# Patient Record
Sex: FEMALE | Race: WHITE | NOT HISPANIC OR LATINO | Employment: FULL TIME | URBAN - METROPOLITAN AREA
[De-identification: names, ages, dates, MRNs, and addresses within clinical notes are randomized per-mention and may not be internally consistent; named-entity substitution may affect disease eponyms.]

---

## 2020-03-06 ENCOUNTER — HOSPITAL ENCOUNTER (EMERGENCY)
Facility: HOSPITAL | Age: 70
Discharge: HOME | End: 2020-03-06
Attending: EMERGENCY MEDICINE | Admitting: EMERGENCY MEDICINE
Payer: COMMERCIAL

## 2020-03-06 VITALS
SYSTOLIC BLOOD PRESSURE: 128 MMHG | OXYGEN SATURATION: 97 % | RESPIRATION RATE: 17 BRPM | BODY MASS INDEX: 25.78 KG/M2 | TEMPERATURE: 97.8 F | DIASTOLIC BLOOD PRESSURE: 71 MMHG | WEIGHT: 127.65 LBS | HEART RATE: 84 BPM

## 2020-03-06 DIAGNOSIS — R42 DIZZINESS: Primary | ICD-10-CM

## 2020-03-06 DIAGNOSIS — Z77.098 EXPOSURE TO CHEMICAL INHALATION: ICD-10-CM

## 2020-03-06 PROCEDURE — 99285 EMERGENCY DEPT VISIT HI MDM: CPT | Performed by: PHYSICIAN ASSISTANT

## 2020-03-06 PROCEDURE — 93005 ELECTROCARDIOGRAM TRACING: CPT

## 2020-03-06 PROCEDURE — 99284 EMERGENCY DEPT VISIT MOD MDM: CPT

## 2020-03-07 LAB
ATRIAL RATE: 74 BPM
P AXIS: 64 DEGREES
PR INTERVAL: 124 MS
QRS AXIS: 70 DEGREES
QRSD INTERVAL: 72 MS
QT INTERVAL: 370 MS
QTC INTERVAL: 410 MS
T WAVE AXIS: 74 DEGREES
VENTRICULAR RATE: 74 BPM

## 2020-03-07 PROCEDURE — 93010 ELECTROCARDIOGRAM REPORT: CPT | Performed by: INTERNAL MEDICINE

## 2020-03-07 NOTE — ED PROVIDER NOTES
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, states dizziness was first noticed after use of this spray, no previous issues with dizziness  She denies wearing a mask while using spray  She admits to dizziness only while at her job using the spray, states symptoms resolve shortly after leaving [employer]  She does not take any medications  She denies headache, nausea, vomiting, syncope, chest pain, shortness of breath, increased weakness or fatigue, decreased oral intake, myalgias, confusion or disorientation  No head trauma or falls recently  Feels that her balance while standing and walking is normal  Currently states she is asymptomatic  None       History reviewed  No pertinent past medical history  History reviewed  No pertinent surgical history  History reviewed  No pertinent family history  I have reviewed and agree with the history as documented  E-Cigarette/Vaping     E-Cigarette/Vaping Substances     Social History     Tobacco Use    Smoking status: Current Every Day Smoker    Smokeless tobacco: Never Used   Substance Use Topics    Alcohol use: Never     Frequency: Never    Drug use: Not on file       Review of Systems   Constitutional: Positive for activity change  Negative for chills, diaphoresis, fatigue and fever  HENT: Negative for congestion, ear pain, sinus pressure and sinus pain  Eyes: Negative for photophobia, pain and visual disturbance     Respiratory: Negative for cough, chest tightness and shortness of breath  Cardiovascular: Negative for chest pain and palpitations  Gastrointestinal: Negative for abdominal distention, abdominal pain, blood in stool, diarrhea, nausea and vomiting  Genitourinary: Negative for decreased urine volume, difficulty urinating, dysuria, frequency, hematuria and urgency  Musculoskeletal: Negative for myalgias and neck pain  Skin: Negative for pallor and rash  Neurological: Positive for dizziness and light-headedness  Negative for tremors, seizures, syncope, facial asymmetry, speech difficulty, weakness, numbness and headaches  Hematological: Negative for adenopathy  Psychiatric/Behavioral: Negative for confusion  Physical Exam  Physical Exam   Constitutional: She is oriented to person, place, and time  She appears well-developed and well-nourished  She is cooperative  Non-toxic appearance  No distress  HENT:   Head: Normocephalic and atraumatic  Right Ear: Tympanic membrane normal    Left Ear: Tympanic membrane normal    Mouth/Throat: Oropharynx is clear and moist and mucous membranes are normal    Eyes: Pupils are equal, round, and reactive to light  Conjunctivae and EOM are normal    Neck: Normal range of motion and full passive range of motion without pain  Neck supple  Cardiovascular: Normal rate, regular rhythm, normal heart sounds and intact distal pulses  Exam reveals no decreased pulses  No murmur heard  Pulmonary/Chest: Effort normal and breath sounds normal    Abdominal: Soft  Normal appearance, normal aorta and bowel sounds are normal  There is no tenderness  Neurological: She is alert and oriented to person, place, and time  She has normal strength  No cranial nerve deficit or sensory deficit  She displays a negative Romberg sign  Coordination and gait normal  GCS eye subscore is 4  GCS verbal subscore is 5  GCS motor subscore is 6  Skin: Skin is warm and dry  Capillary refill takes less than 2 seconds   No lesion and no rash noted  She is not diaphoretic  No erythema  No pallor  Vitals reviewed  Vital Signs  ED Triage Vitals [03/06/20 2218]   Temperature Pulse Respirations Blood Pressure SpO2   97 8 °F (36 6 °C) 84 17 128/71 97 %      Temp Source Heart Rate Source Patient Position - Orthostatic VS BP Location FiO2 (%)   Oral Monitor -- Right arm --      Pain Score       No Pain           Vitals:    03/06/20 2218   BP: 128/71   Pulse: 84         Visual Acuity  Visual Acuity      Most Recent Value   L Pupil Size (mm)  3   R Pupil Size (mm)  3          ED Medications  Medications - No data to display    Diagnostic Studies  Results Reviewed     None                 No orders to display              Procedures  ECG 12 Lead Documentation Only  Date/Time: 3/7/2020 3:27 AM  Performed by: Tiff Cooley PA-C  Authorized by: Tiff Cooley PA-C     Indications / Diagnosis:  Dizziness  ECG reviewed by me, the ED Provider: yes    Patient location:  ED and bedside  Previous ECG:     Previous ECG:  Unavailable    Comparison to cardiac monitor: No    Interpretation:     Interpretation: normal    Rate:     ECG rate:  74    ECG rate assessment: normal    Rhythm:     Rhythm: sinus rhythm    Ectopy:     Ectopy: none    QRS:     QRS axis:  Normal    QRS intervals:  Normal  Conduction:     Conduction: normal    ST segments:     ST segments:  Normal  T waves:     T waves: inverted      Inverted:  AVL             ED Course  ED Course as of Mar 07 0328   Fri Mar 06, 2020   2247 Patient presents for dizziness for the past 3 days, which started after she began using any disinfected spray at work  No previous dizziness like this before  Dizziness resolves after leaving her job where she uses the disinfectant  No dizziness presently  No chest pain or any other symptoms  Vital signs stable  Check EKG which shows normal sinus rhythm with no acute ST segment elevation and isolated T-wave inversion in aVL    Offered blood work to check for leukocytosis, electrolytes, and kidney function but patient declines  Supplied patient with 3 masks and instructed that she wear these at work while using disinfectant spray  Instructed follow-up with PCP if no improvement or worsening  MDM  Number of Diagnoses or Management Options  Dizziness:   Exposure to chemical inhalation:      Amount and/or Complexity of Data Reviewed  Tests in the medicine section of CPT®: ordered and reviewed  Decide to obtain previous medical records or to obtain history from someone other than the patient: yes  Obtain history from someone other than the patient: yes  Review and summarize past medical records: yes  Discuss the patient with other providers: yes    Risk of Complications, Morbidity, and/or Mortality  General comments: See ED course note for dispo and plan  I reviewed and discussed EKG findings with the patient at bedside  I discussed emergency department return parameters  I answered any and all questions the patient had regarding emergency department course of evaluation and treatment  The patient verbalized understanding of and agreement with plan  Patient Progress  Patient progress: stable        Disposition  Final diagnoses:   Dizziness   Exposure to chemical inhalation     Time reflects when diagnosis was documented in both MDM as applicable and the Disposition within this note     Time User Action Codes Description Comment    3/6/2020 10:45 PM Zechariah Workman Add [R42] Dizziness     3/6/2020 10:45 PM Zechariah Workman Add [Z77 098] Exposure to chemical inhalation       ED Disposition     ED Disposition Condition Date/Time Comment    Discharge Stable Fri Mar 6, 2020 10:45 PM [address] discharge to home/self care              Follow-up Information     Follow up With Specialties Details Why Contact Info Additional Information    Miac Jackson,  General Practice Schedule an appointment as soon as possible for a visit in 3 days If symptoms worsen or do not improve [address]  [facility] [address]  [address] [facility] Emergency Department Emergency Medicine Go to  For further evaluation [address] [facility] [address] [facility], [address] [place] [address], [id number]          There are no discharge medications for this patient  No discharge procedures on file      PDMP Review     None          ED Provider  Electronically Signed by           Waldo Sheehan PA-C  03/07/20 4486

## 2020-03-08 ENCOUNTER — HOSPITAL ENCOUNTER (EMERGENCY)
Facility: HOSPITAL | Age: 70
Discharge: HOME | End: 2020-03-08
Attending: EMERGENCY MEDICINE | Admitting: EMERGENCY MEDICINE
Payer: COMMERCIAL

## 2020-03-08 VITALS
DIASTOLIC BLOOD PRESSURE: 58 MMHG | TEMPERATURE: 98.4 F | BODY MASS INDEX: 25.65 KG/M2 | RESPIRATION RATE: 18 BRPM | WEIGHT: 127 LBS | HEART RATE: 73 BPM | SYSTOLIC BLOOD PRESSURE: 119 MMHG | OXYGEN SATURATION: 99 %

## 2020-03-08 DIAGNOSIS — R42 VERTIGO: Primary | ICD-10-CM

## 2020-03-08 LAB
ALBUMIN SERPL BCP-MCNC: 3.6 G/DL (ref 3.5–5)
ALP SERPL-CCNC: 95 U/L (ref 46–116)
ALT SERPL W P-5'-P-CCNC: 16 U/L (ref 12–78)
ANION GAP SERPL CALCULATED.3IONS-SCNC: 6 MMOL/L (ref 4–13)
AST SERPL W P-5'-P-CCNC: 12 U/L (ref 5–45)
BASOPHILS # BLD AUTO: 0.13 THOUSANDS/ΜL (ref 0–0.1)
BASOPHILS NFR BLD AUTO: 2 % (ref 0–1)
BILIRUB SERPL-MCNC: 0.2 MG/DL (ref 0.2–1)
BUN SERPL-MCNC: 21 MG/DL (ref 5–25)
CALCIUM SERPL-MCNC: 8.8 MG/DL (ref 8.3–10.1)
CHLORIDE SERPL-SCNC: 107 MMOL/L (ref 100–108)
CO2 SERPL-SCNC: 28 MMOL/L (ref 21–32)
CREAT SERPL-MCNC: 0.83 MG/DL (ref 0.6–1.3)
EOSINOPHIL # BLD AUTO: 0.39 THOUSAND/ΜL (ref 0–0.61)
EOSINOPHIL NFR BLD AUTO: 6 % (ref 0–6)
ERYTHROCYTE [DISTWIDTH] IN BLOOD BY AUTOMATED COUNT: 13.5 % (ref 11.6–15.1)
GFR SERPL CREATININE-BSD FRML MDRD: 73 ML/MIN/1.73SQ M
GLUCOSE SERPL-MCNC: 93 MG/DL (ref 65–140)
HCT VFR BLD AUTO: 40.3 % (ref 34.8–46.1)
HGB BLD-MCNC: 12.9 G/DL (ref 11.5–15.4)
IMM GRANULOCYTES # BLD AUTO: 0.02 THOUSAND/UL (ref 0–0.2)
IMM GRANULOCYTES NFR BLD AUTO: 0 % (ref 0–2)
LYMPHOCYTES # BLD AUTO: 2.33 THOUSANDS/ΜL (ref 0.6–4.47)
LYMPHOCYTES NFR BLD AUTO: 33 % (ref 14–44)
MCH RBC QN AUTO: 30.3 PG (ref 26.8–34.3)
MCHC RBC AUTO-ENTMCNC: 32 G/DL (ref 31.4–37.4)
MCV RBC AUTO: 95 FL (ref 82–98)
MONOCYTES # BLD AUTO: 0.86 THOUSAND/ΜL (ref 0.17–1.22)
MONOCYTES NFR BLD AUTO: 12 % (ref 4–12)
NEUTROPHILS # BLD AUTO: 3.39 THOUSANDS/ΜL (ref 1.85–7.62)
NEUTS SEG NFR BLD AUTO: 47 % (ref 43–75)
NRBC BLD AUTO-RTO: 0 /100 WBCS
PLATELET # BLD AUTO: 168 THOUSANDS/UL (ref 149–390)
PMV BLD AUTO: 11.5 FL (ref 8.9–12.7)
POTASSIUM SERPL-SCNC: 4.8 MMOL/L (ref 3.5–5.3)
PROT SERPL-MCNC: 6.8 G/DL (ref 6.4–8.2)
RBC # BLD AUTO: 4.26 MILLION/UL (ref 3.81–5.12)
SODIUM SERPL-SCNC: 141 MMOL/L (ref 136–145)
WBC # BLD AUTO: 7.12 THOUSAND/UL (ref 4.31–10.16)

## 2020-03-08 PROCEDURE — 85025 COMPLETE CBC W/AUTO DIFF WBC: CPT | Performed by: EMERGENCY MEDICINE

## 2020-03-08 PROCEDURE — 36415 COLL VENOUS BLD VENIPUNCTURE: CPT | Performed by: EMERGENCY MEDICINE

## 2020-03-08 PROCEDURE — 99284 EMERGENCY DEPT VISIT MOD MDM: CPT

## 2020-03-08 PROCEDURE — 93005 ELECTROCARDIOGRAM TRACING: CPT

## 2020-03-08 PROCEDURE — 99284 EMERGENCY DEPT VISIT MOD MDM: CPT | Performed by: EMERGENCY MEDICINE

## 2020-03-08 PROCEDURE — 80053 COMPREHEN METABOLIC PANEL: CPT | Performed by: EMERGENCY MEDICINE

## 2020-03-08 RX ORDER — MECLIZINE HCL 12.5 MG/1
12.5 TABLET ORAL ONCE
Status: COMPLETED | OUTPATIENT
Start: 2020-03-08 | End: 2020-03-08

## 2020-03-08 RX ORDER — MECLIZINE HCL 12.5 MG/1
12.5 TABLET ORAL EVERY 12 HOURS PRN
Qty: 14 TABLET | Refills: 0 | Status: SHIPPED | OUTPATIENT
Start: 2020-03-08 | End: 2020-03-15

## 2020-03-08 RX ADMIN — MECLIZINE HCL 12.5 MG 12.5 MG: 12.5 TABLET ORAL at 17:03

## 2020-03-08 NOTE — ED PROVIDER NOTES
History  Chief Complaint   Patient presents with    Dizziness     pt states she was seen friday night for dizziness and "i am still a little dizzi"      HPI     79-year-old female with no significant medical history presenting for evaluation dizziness that is been present for the last 5 days  Dizziness is described as a feeling of my equilibrium being off when I walk   She denies any sensation of dizziness when sitting down or lying flat  It is worsened by changing positions  She 1st noticed it after using a disinfectant spray at work and initially thought that it was because of that, however has not use the spray in several days and continues to experience this intermittently  She denies confusion or disorientation  No vision changes or headache  No history of stroke or TIA  Denies nausea, vomiting, syncope, shortness of breath, focal weakness, sensory deficits, or difficulty speaking  She has not fallen  She was seen in the emergency department 2 days ago and at that time declined testing  None       History reviewed  No pertinent past medical history  History reviewed  No pertinent surgical history  History reviewed  No pertinent family history  I have reviewed and agree with the history as documented  E-Cigarette/Vaping    E-Cigarette Use Never User      E-Cigarette/Vaping Substances     Social History     Tobacco Use    Smoking status: Current Every Day Smoker     Packs/day: 0 50     Types: Cigarettes    Smokeless tobacco: Never Used   Substance Use Topics    Alcohol use: Never     Frequency: Never    Drug use: Not on file       Review of Systems   Constitutional: Negative for chills and fever  HENT: Negative for congestion  Eyes: Negative for visual disturbance  Respiratory: Negative for cough and shortness of breath  Cardiovascular: Negative for chest pain and leg swelling  Gastrointestinal: Negative for abdominal pain, diarrhea, nausea and vomiting     Genitourinary: Negative for dysuria and frequency  Musculoskeletal: Negative for arthralgias, back pain, neck pain and neck stiffness  Skin: Negative for rash  Neurological: Positive for dizziness  Negative for syncope, facial asymmetry, speech difficulty, weakness, light-headedness, numbness and headaches  Psychiatric/Behavioral: Negative for agitation, behavioral problems and confusion  Physical Exam  Physical Exam   Constitutional: She is oriented to person, place, and time  She appears well-developed and well-nourished  No distress  HENT:   Head: Normocephalic and atraumatic  Right Ear: External ear normal    Left Ear: External ear normal    Nose: Nose normal    Mouth/Throat: Oropharynx is clear and moist    TMs unremarkable bilaterally   Eyes: Pupils are equal, round, and reactive to light  Conjunctivae and EOM are normal    Horizontal and gaze nystagmus, appears to be leftward, fatigable   Neck: Normal range of motion  Neck supple  Cardiovascular: Normal rate, regular rhythm and normal heart sounds  Exam reveals no gallop and no friction rub  No murmur heard  Pulmonary/Chest: Effort normal and breath sounds normal  No respiratory distress  She has no wheezes  She has no rales  Abdominal: Soft  Bowel sounds are normal  She exhibits no distension  There is no tenderness  There is no guarding  Musculoskeletal: Normal range of motion  She exhibits no edema or deformity  Neurological: She is alert and oriented to person, place, and time  She exhibits normal muscle tone  Face symmetric, tongue midline, 5/5 strength in the proximal and distal upper and lower extremities bilaterally with intact sensation to light touch throughout  CN II-XII intact  Normal finger-to-nose, rapid alternating movements, and heel-to-shin bilaterally  Normal speech, normal gait  No pronator drift  Skin: Skin is warm and dry  She is not diaphoretic         Vital Signs  ED Triage Vitals [03/08/20 1610]   Temperature Pulse Respirations Blood Pressure SpO2   98 4 °F (36 9 °C) 73 20 155/66 94 %      Temp Source Heart Rate Source Patient Position - Orthostatic VS BP Location FiO2 (%)   Oral Monitor Sitting Left arm --      Pain Score       --           Vitals:    03/08/20 1610 03/08/20 1702   BP: 155/66 119/58   Pulse: 73 73   Patient Position - Orthostatic VS: Sitting Lying         Visual Acuity  Visual Acuity      Most Recent Value   L Pupil Size (mm)  3   R Pupil Size (mm)  3          ED Medications  Medications   meclizine (ANTIVERT) tablet 12 5 mg (12 5 mg Oral Given 3/8/20 1703)       Diagnostic Studies  Results Reviewed     Procedure Component Value Units Date/Time    Comprehensive metabolic panel [[id number]] Collected:  03/08/20 1707    Lab Status:  Final result Specimen:  Blood from Arm, Right Updated:  03/08/20 1728     Sodium 141 mmol/L      Potassium 4 8 mmol/L      Chloride 107 mmol/L      CO2 28 mmol/L      ANION GAP 6 mmol/L      BUN 21 mg/dL      Creatinine 0 83 mg/dL      Glucose 93 mg/dL      Calcium 8 8 mg/dL      AST 12 U/L      ALT 16 U/L      Alkaline Phosphatase 95 U/L      Total Protein 6 8 g/dL      Albumin 3 6 g/dL      Total Bilirubin 0 20 mg/dL      eGFR 73 ml/min/1 73sq m     Narrative:       [place] guidelines for Chronic Kidney Disease (CKD):     Stage 1 with normal or high GFR (GFR > 90 mL/min/1 73 square meters)    Stage 2 Mild CKD (GFR = 60-89 mL/min/1 73 square meters)    Stage 3A Moderate CKD (GFR = 45-59 mL/min/1 73 square meters)    Stage 3B Moderate CKD (GFR = 30-44 mL/min/1 73 square meters)    Stage 4 Severe CKD (GFR = 15-29 mL/min/1 73 square meters)    Stage 5 End Stage CKD (GFR <15 mL/min/1 73 square meters)  Note: GFR calculation is accurate only with a steady state creatinine    CBC and differential [[id number]]  (Abnormal) Collected:  03/08/20 1707    Lab Status:  Final result Specimen:  Blood from Arm, Right Updated:  03/08/20 1712     WBC 7 12 Thousand/uL      RBC 4 26 Million/uL      Hemoglobin 12 9 g/dL      Hematocrit 40 3 %      MCV 95 fL      MCH 30 3 pg      MCHC 32 0 g/dL      RDW 13 5 %      MPV 11 5 fL      Platelets 180 Thousands/uL      nRBC 0 /100 WBCs      Neutrophils Relative 47 %      Immat GRANS % 0 %      Lymphocytes Relative 33 %      Monocytes Relative 12 %      Eosinophils Relative 6 %      Basophils Relative 2 %      Neutrophils Absolute 3 39 Thousands/µL      Immature Grans Absolute 0 02 Thousand/uL      Lymphocytes Absolute 2 33 Thousands/µL      Monocytes Absolute 0 86 Thousand/µL      Eosinophils Absolute 0 39 Thousand/µL      Basophils Absolute 0 13 Thousands/µL                  No orders to display              Procedures  Procedures         ED Course           Identification of Seniors at Risk      Most Recent Value   (ISAR) Identification of Seniors at Risk   Before the illness or injury that brought you to the Emergency, did you need someone to help you on a regular basis? 0 Filed at: 03/08/2020 1612   In the last 24 hours, have you needed more help than usual?  0 Filed at: 03/08/2020 1612   Have you been hospitalized for one or more nights during the past 6 months? 0 Filed at: 03/08/2020 1612   In general, do you see well?  0 Filed at: 03/08/2020 1612   In general, do you have serious problems with your memory? 0 Filed at: 03/08/2020 1612   Do you take more than three different medications every day?  0 Filed at: 03/08/2020 1612   ISAR Score  0 Filed at: 03/08/2020 1612                          MDM  Number of Diagnoses or Management Options  Vertigo: new and requires workup  Diagnosis management comments: Generally well appearing  Afebrile and hemodynamically stable  Normal neurologic exam as above  No central findings of ataxia  Patient walks with a steady gait states that she feels unsteady on her feet  She does have leftward fatigable end gaze horizontal nystagmus    Sensation of disequilibrium is present only with moving  Suspect benign paroxysmal positional vertigo  No evidence of stroke or TIA  I personally interpreted the patient's EKG which reveals rate normal sinus rhythm, normal axis, normal intervals, Q-wave in leads aVL V1 unchanged from prior, no acute ischemic changes  CBC and CMP unremarkable  Patient given meclizine, states that now she feels much better  Will prescribe meclizine p r n  Recommend follow up with her doctor as needed in 1 week if symptoms persist, patient counseled that she may need follow-up vestibular therapy if symptoms persist   Return precautions discussed and patient discharged in good condition  Amount and/or Complexity of Data Reviewed  Clinical lab tests: ordered and reviewed           Disposition  Final diagnoses:   Vertigo     Time reflects when diagnosis was documented in both MDM as applicable and the Disposition within this note     Time User Action Codes Description Comment    3/8/2020  5:54 PM Adela Woodruff Add [R42] Vertigo       ED Disposition     ED Disposition Condition Date/Time Comment    Discharge Stable Sun Mar 8, 2020  5:54 PM [address] discharge to home/self care  Follow-up Information     Follow up With Specialties Details Why Contact Info Additional Information    Clif Hunter, DO General Practice Call in 1 week If your dizziness persists  [address]  [facility] [address] Emergency Department Emergency Medicine  As we discussed, return to the Emergency Department immediately for worsening dizziness, headache, chest pain, vision changes, difficulty walking or speaking, or new or concerning symptoms   [address] [facility] [address]  [address] [facility] ED, [address] [place], [place], [address]          Discharge Medication List as of 3/8/2020  5:55 PM      START taking these medications    Details   meclizine (ANTIVERT) 12 5 MG tablet Take 1 tablet (12 5 mg total) by mouth every 12 (twelve) hours as needed for dizziness for up to 7 days, Starting Sun 3/8/2020, Until Sun 3/15/2020, Print           No discharge procedures on file      PDMP Review     None          ED Provider  Electronically Signed by           Jose Alfredo Escobar MD  03/08/20 5906

## 2020-03-09 LAB
ATRIAL RATE: 76 BPM
P AXIS: 79 DEGREES
PR INTERVAL: 120 MS
QRS AXIS: 84 DEGREES
QRSD INTERVAL: 74 MS
QT INTERVAL: 364 MS
QTC INTERVAL: 409 MS
T WAVE AXIS: 80 DEGREES
VENTRICULAR RATE: 76 BPM

## 2020-03-09 PROCEDURE — 93010 ELECTROCARDIOGRAM REPORT: CPT | Performed by: INTERNAL MEDICINE

## 2020-05-05 ENCOUNTER — PREPPED CHART (OUTPATIENT)
Dept: URBAN - METROPOLITAN AREA CLINIC 6 | Facility: CLINIC | Age: 70
End: 2020-05-05

## 2020-08-09 ENCOUNTER — OFFICE VISIT (OUTPATIENT)
Dept: URGENT CARE | Facility: CLINIC | Age: 70
End: 2020-08-09
Payer: COMMERCIAL

## 2020-08-09 ENCOUNTER — APPOINTMENT (OUTPATIENT)
Dept: RADIOLOGY | Facility: CLINIC | Age: 70
End: 2020-08-09
Payer: COMMERCIAL

## 2020-08-09 VITALS
HEIGHT: 59 IN | DIASTOLIC BLOOD PRESSURE: 80 MMHG | BODY MASS INDEX: 25.8 KG/M2 | RESPIRATION RATE: 16 BRPM | WEIGHT: 128 LBS | TEMPERATURE: 97.8 F | SYSTOLIC BLOOD PRESSURE: 140 MMHG | OXYGEN SATURATION: 100 % | HEART RATE: 82 BPM

## 2020-08-09 DIAGNOSIS — S99.921A INJURY OF TOE ON RIGHT FOOT, INITIAL ENCOUNTER: Primary | ICD-10-CM

## 2020-08-09 DIAGNOSIS — S99.921A INJURY OF TOE ON RIGHT FOOT, INITIAL ENCOUNTER: ICD-10-CM

## 2020-08-09 PROCEDURE — 99213 OFFICE O/P EST LOW 20 MIN: CPT | Performed by: PHYSICIAN ASSISTANT

## 2020-08-09 PROCEDURE — S9088 SERVICES PROVIDED IN URGENT: HCPCS | Performed by: PHYSICIAN ASSISTANT

## 2020-08-09 PROCEDURE — 73630 X-RAY EXAM OF FOOT: CPT

## 2020-08-09 NOTE — LETTER
August 9, 2020     Patient: Cristina Webb   YOB: 1950   Date of Visit: 8/9/2020       To Whom it May Concern:    Joe Peter is under my professional care  She was seen in my office on 8/9/2020  She may return to work on 8/10/20  If you have any questions or concerns, please don't hesitate to call           Sincerely,          ASA Whipple        CC: No Recipients

## 2020-08-09 NOTE — PATIENT INSTRUCTIONS
X-ray shows no fracture the toe  We will place her in a postop shoe for support  Ice the toe  She can take Tylenol for the pain  Elevate the foot at home  May take 1-2 weeks to resolve

## 2020-08-09 NOTE — PROGRESS NOTES
[address] [facility]        NAME: Jaren Case is a 81 y o  female  : 1950    MRN: [id number]  DATE: 2020  TIME: 3:46 PM    Assessment and Plan   Injury of toe on right foot, initial encounter [K75 195Q]  1  Injury of toe on right foot, initial encounter  XR foot 3+ vw right         Patient Instructions   Patient Instructions    X-ray shows no fracture the toe  We will place her in a postop shoe for support  Ice the toe  She can take Tylenol for the pain  Elevate the foot at home  May take 1-2 weeks to resolve  Follow up with PCP in 3-5 days  Proceed to  ER if symptoms worsen  Chief Complaint     Chief Complaint   Patient presents with    Toe Pain     R pinky toe pain, states she bumped it on a cardboard box this afternoon while barefoot         History of Present Illness         62-year-old female complains of right small toe pain today  She kicked the box while walking  She has some bruising about the toe  Some pain with walking and weight-bearing  No treatment at home  No numbness or tingling  No pain in the foot  Review of Systems   Review of Systems   Constitutional: Negative for chills, fatigue and fever  Eyes: Negative for visual disturbance  Respiratory: Negative for chest tightness and shortness of breath  Cardiovascular: Negative for chest pain and palpitations  Gastrointestinal: Negative for diarrhea, nausea and vomiting  Musculoskeletal: Positive for arthralgias and joint swelling  Neurological: Negative for dizziness           Current Medications       Current Outpatient Medications:     meclizine (ANTIVERT) 12 5 MG tablet, Take 1 tablet (12 5 mg total) by mouth every 12 (twelve) hours as needed for dizziness for up to 7 days, Disp: 14 tablet, Rfl: 0    Current Allergies     Allergies as of 2020 - Reviewed 2020   Allergen Reaction Noted    Amoxicillin Anaphylaxis 2020    Penicillins Anaphylaxis 2020            The following portions of the patient's history were reviewed and updated as appropriate: allergies, current medications, past family history, past medical history, past social history, past surgical history and problem list      History reviewed  No pertinent past medical history  History reviewed  No pertinent surgical history  History reviewed  No pertinent family history  Medications have been verified  Objective   /80   Pulse 82   Temp 97 8 °F (36 6 °C) (Temporal)   Resp 16   Ht 4' 11" (1 499 m)   Wt 58 1 kg (128 lb)   SpO2 100%   BMI 25 85 kg/m²        Physical Exam     Physical Exam  Constitutional:       General: She is not in acute distress  Appearance: She is well-developed  Musculoskeletal:      Comments:   Right 5th toe bruised and swollen about the IP joint  Tender over the MTP joint  She has range of motion  Of the MTP joint with minimal pain  Brisk cap refill  Sensation intact  Neurological:      Mental Status: She is alert and oriented to person, place, and time

## 2021-09-17 ENCOUNTER — PROBLEM (OUTPATIENT)
Dept: URBAN - METROPOLITAN AREA CLINIC 6 | Facility: CLINIC | Age: 71
End: 2021-09-17

## 2021-09-17 DIAGNOSIS — H02.834: ICD-10-CM

## 2021-09-17 DIAGNOSIS — H02.831: ICD-10-CM

## 2021-09-17 DIAGNOSIS — Z96.1: ICD-10-CM

## 2021-09-17 PROCEDURE — G8427 DOCREV CUR MEDS BY ELIG CLIN: HCPCS

## 2021-09-17 PROCEDURE — 92014 COMPRE OPH EXAM EST PT 1/>: CPT

## 2021-09-17 ASSESSMENT — VISUAL ACUITY
OS_SC: 20/30-1
OD_SC: 20/30-2
OS_PH: 20/30-1
OD_PH: 20/25

## 2021-09-17 ASSESSMENT — TONOMETRY
OD_IOP_MMHG: 16
OS_IOP_MMHG: 17

## 2021-11-10 ENCOUNTER — OFFICE VISIT (OUTPATIENT)
Dept: LAB | Facility: HOSPITAL | Age: 71
End: 2021-11-10
Payer: COMMERCIAL

## 2021-11-10 ENCOUNTER — APPOINTMENT (OUTPATIENT)
Dept: LAB | Facility: HOSPITAL | Age: 71
End: 2021-11-10
Payer: COMMERCIAL

## 2021-11-10 DIAGNOSIS — Z01.810 PRE-OPERATIVE CARDIOVASCULAR EXAMINATION: ICD-10-CM

## 2021-11-10 LAB
ATRIAL RATE: 68 BPM
P AXIS: 71 DEGREES
PR INTERVAL: 122 MS
QRS AXIS: 77 DEGREES
QRSD INTERVAL: 70 MS
QT INTERVAL: 382 MS
QTC INTERVAL: 406 MS
T WAVE AXIS: 83 DEGREES
VENTRICULAR RATE: 68 BPM

## 2021-11-10 PROCEDURE — 93005 ELECTROCARDIOGRAM TRACING: CPT

## 2021-11-10 PROCEDURE — 93010 ELECTROCARDIOGRAM REPORT: CPT | Performed by: INTERNAL MEDICINE
